# Patient Record
Sex: MALE | Race: WHITE
[De-identification: names, ages, dates, MRNs, and addresses within clinical notes are randomized per-mention and may not be internally consistent; named-entity substitution may affect disease eponyms.]

---
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
## 2017-08-24 NOTE — CR
Right ankle:  Four views of the right ankle were obtained.

 

Comparison: No previous study.

 

Ankle mortise is symmetric.  Fractures are partially visualized within

 the third and second metatarsals.  No additional fracture or other 

bony abnormality is appreciated.

 

Impression:

1.  Metatarsal fractures are partially seen.  Nothing acute is seen 

within the right ankle.

 

Diagnostic code #3

## 2017-08-24 NOTE — CR
Right foot:  Four views of the right foot were obtained.

 

Soft tissue swelling is identified.  Mildly comminuted fracture is 

identified within the proximal shaft and base of the third metatarsal.

  Nondisplaced fracture is noted within the base of the second 

metatarsal.  No additional fracture is definitely appreciated.

 

Impression:

1.  Fractures within the second and third metatarsals as described 

above.

2.  Soft tissue swelling.

 

Diagnostic code #3

## 2019-01-12 ENCOUNTER — HOSPITAL ENCOUNTER (EMERGENCY)
Dept: HOSPITAL 41 - JD.ED | Age: 24
Discharge: HOME | End: 2019-01-12
Payer: COMMERCIAL

## 2019-01-12 VITALS — SYSTOLIC BLOOD PRESSURE: 151 MMHG | DIASTOLIC BLOOD PRESSURE: 81 MMHG

## 2019-01-12 DIAGNOSIS — G56.03: Primary | ICD-10-CM

## 2019-01-12 PROCEDURE — 85025 COMPLETE CBC W/AUTO DIFF WBC: CPT

## 2019-01-12 PROCEDURE — 99284 EMERGENCY DEPT VISIT MOD MDM: CPT

## 2019-01-12 PROCEDURE — 83735 ASSAY OF MAGNESIUM: CPT

## 2019-01-12 PROCEDURE — 84443 ASSAY THYROID STIM HORMONE: CPT

## 2019-01-12 PROCEDURE — 80053 COMPREHEN METABOLIC PANEL: CPT

## 2019-01-12 PROCEDURE — 36415 COLL VENOUS BLD VENIPUNCTURE: CPT
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
## 2019-01-18 ENCOUNTER — HOSPITAL ENCOUNTER (EMERGENCY)
Dept: HOSPITAL 41 - JD.ED | Age: 24
Discharge: HOME | End: 2019-01-18
Payer: COMMERCIAL

## 2019-01-18 VITALS — DIASTOLIC BLOOD PRESSURE: 68 MMHG | SYSTOLIC BLOOD PRESSURE: 120 MMHG

## 2019-01-18 DIAGNOSIS — G56.03: Primary | ICD-10-CM

## 2019-01-18 DIAGNOSIS — F17.210: ICD-10-CM
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
## 2019-06-21 ENCOUNTER — HOSPITAL ENCOUNTER (EMERGENCY)
Dept: HOSPITAL 41 - JD.ED | Age: 24
Discharge: LEFT BEFORE DISCHARGE | End: 2019-06-21
Payer: COMMERCIAL

## 2019-06-21 DIAGNOSIS — Z53.21: Primary | ICD-10-CM

## 2020-03-09 NOTE — [id number]
Post Anesthesia Note





- EVALUATION WITHIN 48HRS OF ANESTHETIC


Vital Signs in Normal Range: Yes


Patient Participated in Evaluation: Yes


Respiratory Function Stable: Yes


Airway Patent: Yes


Cardiovascular Function Stable: Yes


Hydration Status Stable: Yes


Pain Control Satisfactory: Yes


Nausea and Vomiting Control Satisfactory: Yes


Mental Status Recovered: Yes


Vital Signs: 


 Last Vital Signs











Temp  97.9 F   03/09/20 06:25


 


Pulse  65   03/09/20 06:25


 


Resp  16   03/09/20 06:25


 


BP  110/82   03/09/20 06:25


 


Pulse Ox  99   03/09/20 06:25








135/84


98%


62


16


97.5

## 2020-03-09 NOTE — PCM.PREANE
Preanesthetic Assessment





- Procedure


Proposed Procedure: 





bilateral carpal tunnel





- Anesthesia/Transfusion/Family Hx


Anesthesia History: Prior Anesthesia Without Reaction


Family History of Anesthesia Reaction: No


Transfusion History: No Prior Transfusion(s)





- Review of Systems


General: No Symptoms


Pulmonary: No Symptoms


Cardiovascular: No Symptoms


Gastrointestinal: No Symptoms


Neurological: No Symptoms


Other: Reports: None





- Physical Assessment


NPO Status Date: 03/08/20


NPO Status Time: 23:55


Vital Signs: 





110/82


65


99%


16


97.9


Height: 5 ft 11 in


Weight: 75.659 kg


ASA Class: 2


Mental Status: Alert & Oriented x3


Airway Class: Mallampati = 1


Dentition: Reports: Normal Dentition


Thyro-Mental Finger Breadths: 3


Mouth Opening Finger Breadths: 3


ROM/Head Extension: Full


Lungs: Clear to Auscultation, Normal Respiratory Effort


Cardiovascular: Regular Rate, Regular Rhythm





- Lab


Values: 





 Laboratory Last Values











MRSA (PCR)  Negative   02/28/20  13:12    














- Allergies


Allergies/Adverse Reactions: 


 Allergies











Allergy/AdvReac Type Severity Reaction Status Date / Time


 


No Known Allergies Allergy   Verified 03/06/20 11:33














- Blood


Blood Available: No





- Acknowledgements


Anesthesia Type Planned: MAC


Pt an Appropriate Candidate for the Planned Anesthesia: Yes


Alternatives and Risks of Anesthesia Discussed w Pt/Guardian: Yes


Pt/Guardian Understands and Agrees with Anesthesia Plan: Yes





PreAnesthesia Questionnaire





- Past Health History


Medical/Surgical History: Denies Medical/Surgical History


HEENT History: Reports: Allergic Rhinitis


Cardiovascular History: Reports: None


Respiratory History: Reports: None


Gastrointestinal History: Reports: GERD


Genitourinary History: Reports: None


OB/GYN History: Reports: None


Musculoskeletal History: Reports: Other (See Below)


Neurological History: Reports: Concussion


Psychiatric History: Reports: None


Endocrine/Metabolic History: Reports: None


Hematologic History: Reports: None


Immunologic History: Reports: None


Oncologic (Cancer) History: Reports: None


Dermatologic History: Reports: None





- Past Surgical History


Head Surgeries/Procedures: Reports: None


HEENT Surgical History: Reports: None


Cardiovascular Surgical History: Reports: None


GI Surgical History: Reports: Appendectomy, Hernia, Inguinal


Female  Surgical History: Reports: None


Male  Surgical History: Reports: None


Endocrine Surgical History: Reports: None


Neurological Surgical History: Reports: None


Musculoskeletal Surgical History: Reports: Other (See Below)


Oncologic Surgical History: Reports: None


Dermatological Surgical History: Reports: None





- SUBSTANCE USE


Tobacco Use Within Last Twelve Months: Snuff/Dip


Second Hand Smoke Exposure: No


Days Per Week of Alcohol Use: 1 (socially)


Recreational Drug Use History: No





- HOME MEDS


Home Medications: 


 Home Meds





Acetaminophen/HYDROcodone [Norco 325-5 MG] 1 - 2 tab PO Q6H PRN #15 tablet 03/09 /20 [Rx]











- CURRENT (IN HOUSE) MEDS


Current Meds: 





 Current Medications





Lactated Ringer's (Ringers, Lactated)  1,000 mls @ 125 mls/hr IV ASDIRECTED SYBIL


   Stop: 03/09/20 23:00


Lidocaine/Sodium Bicarbonate (Buffered Lidocaine 1% In Ns 8.4%)  0.25 ml IDERM 

ONETIME PRN


   PRN Reason: Prior to IV Start


   Stop: 03/09/20 18:00


Sodium Chloride (Saline Flush)  10 ml FLUSH ASDIRECTED PRN


   PRN Reason: Keep Vein Open


   Stop: 03/09/20 18:00





Discontinued Medications





Fentanyl (Sublimaze) Confirm Administered Dose 100 mcg .ROUTE .STK-MED ONE


   Stop: 03/09/20 06:33


Midazolam HCl (Versed 1 Mg/Ml) Confirm Administered Dose 2 mg .ROUTE .STK-MED 

ONE


   Stop: 03/09/20 06:33


Propofol (Diprivan  20 Ml) Confirm Administered Dose 400 mg .ROUTE .STK-MED ONE


   Stop: 03/09/20 06:32

## 2020-03-12 NOTE — PCM.OPNOTE
- General Post-Op/Procedure Note


Date of Surgery/Procedure: 03/09/20


Operative Procedure(s): bilateral carpal tunnel release


Pre Op Diagnosis: bilateral median nerve compression neuropathy


Post-Op Diagnosis: Same


Anesthesia Technique: Local, MAC


Primary Surgeon: Roberto Carlos Chambers


Anesthesia Provider: Klaudia Lowry


Assistant: Laly Lima


EBL in mLs: 5


Complications: None


Condition: Good

## 2020-03-12 NOTE — OR
DATE OF OPERATION:  03/09/2020

 

SURGEON:  Roberto Carlos Chambers MD

 

OPERATION PERFORMED:  Bilateral carpal tunnel release.

 

PREOPERATIVE DIAGNOSIS:

Bilateral median nerve compression neuropathy.

 

POSTOPERATIVE DIAGNOSIS:

Bilateral median nerve compression neuropathy.

 

ANESTHESIA:

Local MAC.

 

ANESTHESIA PROVIDER:

Klaudia Lowry CRNA.

 

ASSISTANT:

Laly Lima PA-C.

 

ESTIMATED BLOOD LOSS:

Less than 5 mL.

 

COMPLICATIONS:

None.

 

CONDITION:

Stable.

 

DESCRIPTION OF PROCEDURE:

The patient was identified in the preop holding area.  Proper site was marked

and identified by the surgeon.  The patient was taken back to the operating

theater where after adequate anesthesia, the patient's bilateral upper 
extremities

was sterilely prepped and draped in the usual sterile fashion.  OR time-out was

performed.  The patient did not receive antibiotics and it is not indicated for

soft tissue hand procedure.  At this time, the left upper extremity was

exsanguinated and an Esmarch was used as a tourniquet on the forearm.  At this

time, using 1% lidocaine without epinephrine and 0.25% Marcaine without

epinephrine, the palmar cutaneous branch of the median nerve was anesthetized

and then the incisional site was anesthetized using Fish cardinal line and

ulnar border of the fourth digit as reference.  Once this had set up, an

incision was made.  Blunt dissection was taken down to the palmar cutaneous

fascia.  Palmar cutaneous fascia was incised with a [place] blade.  At this time,

the transverse carpal ligament was identified.  A small rent was made in the

transverse carpal ligament with a [place] blade under direct visualization.

Resection of the transverse carpal ligament was done distally using tenotomy

scissors making sure to stop short of the palmar arch.  At this time, attention

was turned proximally after it was found to be adequately released.  Using the

tenotomy scissors keeping the tips ulnar to protect the palmar cutaneous branch

of the median nerve, the superficial forearm fascia as well as the transverse

carpal ligament were resected proximally.  It was found to be adequate release

both proximally and distally.  At this time, adequate saline was irrigated

through the wound.  4-0 nylon sutures were used closure of the skin. Attention 
was turned to the right side and the same exact steps were done releasing the 
transverse carpal ligament in the same fashion and dressings were applied. The

patient was placed in a sterile soft dressing and sent to PACU in stable

condition.





 

DD:  03/12/2020 06:59:04

DT:  03/12/2020 07:27:06  MMNABEEL

Job #:  [id number]

ALF